# Patient Record
Sex: FEMALE | Race: WHITE | NOT HISPANIC OR LATINO | Employment: STUDENT | URBAN - METROPOLITAN AREA
[De-identification: names, ages, dates, MRNs, and addresses within clinical notes are randomized per-mention and may not be internally consistent; named-entity substitution may affect disease eponyms.]

---

## 2025-05-20 ENCOUNTER — APPOINTMENT (OUTPATIENT)
Dept: OTOLARYNGOLOGY | Facility: CLINIC | Age: 20
End: 2025-05-20
Payer: COMMERCIAL

## 2025-06-23 ENCOUNTER — APPOINTMENT (OUTPATIENT)
Dept: AUDIOLOGY | Facility: CLINIC | Age: 20
End: 2025-06-23
Payer: COMMERCIAL

## 2025-06-23 ENCOUNTER — APPOINTMENT (OUTPATIENT)
Dept: OTOLARYNGOLOGY | Facility: CLINIC | Age: 20
End: 2025-06-23
Payer: COMMERCIAL

## 2025-06-23 DIAGNOSIS — H61.21 IMPACTED CERUMEN OF RIGHT EAR: Primary | ICD-10-CM

## 2025-06-23 DIAGNOSIS — H65.06 RECURRENT ACUTE SEROUS OTITIS MEDIA OF BOTH EARS: ICD-10-CM

## 2025-06-23 PROCEDURE — 99203 OFFICE O/P NEW LOW 30 MIN: CPT | Performed by: PHYSICIAN ASSISTANT

## 2025-06-23 PROCEDURE — 1036F TOBACCO NON-USER: CPT | Performed by: PHYSICIAN ASSISTANT

## 2025-06-23 RX ORDER — NORETHINDRONE ACETATE AND ETHINYL ESTRADIOL AND FERROUS FUMARATE 1.5-30(21)
KIT ORAL
COMMUNITY
Start: 2025-05-08

## 2025-06-23 NOTE — PROGRESS NOTES
Alda Anne is a 20 y.o. year old female patient with history of chronic recurrent ear infections.  Patient reports that as a child she had history of chronic recurrent ear infections stating that she has had 3 sets of PE tubes in her younger years.  She also had adenoidectomy and tonsillectomy.  She states that this past winter she suffered 3 ear infections in 3 months.  She states that her last ear infection was at the end of January early February.  Since that time she has been doing well but occasionally complaining of intermittent plugged sensation in the right ear.  She states that she does utilize Flonase but only utilizes the medication when she is sick with upper respiratory symptoms or congestion.  She states that it seemed as though when she was getting sinusitis she would end up with ear infection.  She is here today for further assessment.  She was scheduled for audiogram however did not make that appointment.  She denies any concern for hearing changes today.         Review of Systems   All other systems reviewed and are negative.        Physical Exam:   General appearance: No acute distress. Normal facies. Symmetric facial movement. No gross lesions of the face are noted.  The external ear structures appear normal.  Patient with tympanosclerosis noted to bilateral tympanic membranes.  Patient does have cerumen right ear removed under the otomicroscope today with the use of #7 suction.  The canal was otherwise unremarkable on the right and unremarkable left.  There is no evidence of middle ear effusion.  Anterior rhinoscopy notes essentially a midline nasal septum. Examination is noted for normal healthy mucosal membranes without any evidence of lesions, polyps, or exudate. The tongue is normally mobile. There are no lesions on the gingiva, buccal, or oral mucosa. There are no oral cavity masses.  The neck is negative for mass lymphadenopathy. The trachea and parotid are clear. The thyroid bed is  grossly unremarkable. The salivary gland structures are grossly unremarkable.    Assessment/Plan   1.  Cerumen impaction  2.  Recurrent otitis media    Patient seen in the office today for assessment of ears.  Patient with reported history of recurrent ear infections.  Today the exam shows no evidence of acute infection.  There is some scarring to the tympanic membrane secondary to multiple sets of previous tubes in the past.  The patient does have cerumen right ear removed today.  I advise daily use of Flonase.  I would like to see the patient back certainly should infections continue.  At this time we will forego audiogram as the patient does not have any acute concerns for hearing changes.  Certainly should she wish to be rescheduled for audiogram I advise reaching out to the office and scheduling audiogram.    All questions and concerns were answered and addressed. The patient expresses understanding and will follow up as advised.    Thank you again for allowing us to participate in the care of this patient.